# Patient Record
Sex: FEMALE | Race: WHITE | Employment: STUDENT | URBAN - METROPOLITAN AREA
[De-identification: names, ages, dates, MRNs, and addresses within clinical notes are randomized per-mention and may not be internally consistent; named-entity substitution may affect disease eponyms.]

---

## 2017-01-05 ENCOUNTER — HOSPITAL ENCOUNTER (OUTPATIENT)
Dept: DIABETES SERVICES | Age: 22
Discharge: HOME | End: 2017-01-05

## 2017-01-05 DIAGNOSIS — E10.9 TYPE 1 DIABETES MELLITUS WITHOUT COMPLICATION (HCC): ICD-10-CM

## 2017-03-06 ENCOUNTER — OFFICE VISIT (OUTPATIENT)
Dept: OBGYN CLINIC | Age: 22
End: 2017-03-06

## 2017-03-06 VITALS
BODY MASS INDEX: 22.7 KG/M2 | WEIGHT: 144.6 LBS | DIASTOLIC BLOOD PRESSURE: 74 MMHG | HEIGHT: 67 IN | SYSTOLIC BLOOD PRESSURE: 120 MMHG

## 2017-03-06 DIAGNOSIS — N92.1 METRORRHAGIA: Primary | ICD-10-CM

## 2017-03-06 NOTE — PROGRESS NOTES
The patient is a 25 y.o.,  Patient's last menstrual period was 2017 (approximate). .  Started OC for heavy periods - she is type I DM. Has had BTB for long time. She is here for contraceptive counseling/folllow up. Her current method of family planning is pill, Dane Fruits. The patient is sexually active. She states she is not satisfied with her current form of contraception because: Patient stated she started taking pill for menses related issues and she was not sexually active. She is now sexually active and has some breakthrough bleeding. She has been using condoms in addition to taking the pill. She was told when she became sexually active she would need to adjust her birth control. She would like to discuss other options including possible IUD. Contraceptive History: has not used other contraception in the past.      Her current menstrual difficulty history: none     Her previous menses she describes as moderate. Preventive Medicine History: Last Pap smear:patient has never had a pap test due to age/protocol. Patient was advised annual is due 17, appointment made. Past Medical History:   Diagnosis Date    Asthma     Diabetes mellitus ([facility] [address]     type 1    Headache(784.0)         History reviewed. No pertinent surgical history. Social History     Occupational History    Not on file.      Social History Main Topics    Smoking status: Never Smoker    Smokeless tobacco: Not on file    Alcohol use No    Drug use: No    Sexual activity: Yes     Partners: Male     Birth control/ protection: Pill, Condom     Family History   Problem Relation Age of Onset    Diabetes Maternal Grandmother      type2    Heart Disease Maternal Grandmother      pacemaker    Other Maternal Grandfather      non hodgkins lymphoma    No Known Problems Mother     No Known Problems Father     Breast Cancer Other        Allergies   Allergen Reactions    Augmentin [Amoxicillin-Pot Clavulanate] Hives  Augmentin [Amoxicillin-Pot Clavulanate] Hives     Prior to Admission medications    Medication Sig Start Date End Date Taking? Authorizing Provider   Norethindrn A-E Estradiol-Iron (LOMEDIA 24 FE) 1 mg-20 mcg (24)/75 mg (4) tab Take 1 Tab by mouth daily. 5/23/16  Yes Ena Altamirano MD   insulin lispro (HUMALOG) 100 unit/mL injection To use up to 100 units daily via insulin pump. 3/11/16  Yes Catalino Aviles MD   glucose blood VI test strips (FREESTYLE TEST) strip TEST UP TO 10 TIMES A DAY 10/12/15  Yes Catalino Aviles MD   Insulin Needles, Disposable, ([place]STON PEN NEEDLE) 32 x 5/32 \" ndle 50 Each by Does Not Apply route four (4) times daily. Use as direct by physician 2/12/15  Yes Catalino Aviles MD   insulin glargine (LANTUS SOLOSTAR) 100 unit/mL (3 mL) pen The patient is to take up to 50 units in a day 2/12/15  Yes Catalino Aviles MD   glucose blood VI test strips (FREESTYLE TEST) strip To test blood sugars up to 10 times daily 9/4/14  Yes Catalino Aviles MD   Lancets (ONE [facility]) misc To test up to 10 times daily 3/24/14  Yes Arlette Hays MD   GLUCAGON EMERGENCY 1 mg injection INJECT 1ML BY INTRAMUSCULAR ROUTE AS NEEDED FOR HYPOGLYCEMIA 8/28/13  Yes Catalino Aviles MD   LANCING DEVICE/LANCETS (ONE TOUCH [facility] [address]) by Does Not Apply route. Yes Historical Provider   glucose 4 gram chewable tablet Take 4 Tabs by mouth as needed. 8/8/12  Yes Jaz Izquierdo MD   multivitamin (ONE A DAY) tablet Take 1 Tab by mouth daily.      Yes Historical Provider        Review of Systems - History obtained from the patient  Constitutional: negative for weight loss, fever, night sweats  HEENT: negative for hearing loss, earache, congestion, snoring, sorethroat  CV: negative for chest pain, palpitations, edema  Resp: negative for cough, shortness of breath, wheezing  Breast: negative for breast lumps, nipple discharge, galactorrhea  GI: negative for change in bowel habits, abdominal pain, black or bloody stools  : negative for frequency, dysuria, hematuria  MSK: negative for back pain, joint pain, muscle pain  Skin: negative for itching, rash, hives  Neuro: negative for dizziness, headache, confusion, weakness  Psych: negative for anxiety, depression, change in mood  Heme/lymph: negative for bleeding, bruising, pallor      Objective:    Visit Vitals    /74    Ht 5' 6.73\" (1.695 m)    Wt 144 lb 9.6 oz (65.6 kg)    LMP 03/03/2017 (Approximate)    BMI 22.83 kg/m2          PHYSICAL EXAMINATION    Constitutional  · Appearance: well-nourished, well developed, alert, in no acute distress    HENT  · Head and Face: appears normal      Skin  · General Inspection: no rash, no lesions identified    Neurologic/Psychiatric  · Mental Status:  · Orientation: grossly oriented to person, place and time  · Mood and Affect: mood normal, affect appropriate    Assessment:   Type I DM  BTB on OC  Hx of heavy periods  Plan:   Place [facility] this week        Instructions given to pt. Handouts given to pt.

## 2017-03-08 ENCOUNTER — HOSPITAL ENCOUNTER (OUTPATIENT)
Dept: DIABETES SERVICES | Age: 22
Discharge: HOME | End: 2017-03-08

## 2017-03-08 DIAGNOSIS — E10.9 TYPE 1 DIABETES MELLITUS WITHOUT COMPLICATION (HCC): ICD-10-CM

## 2017-03-09 ENCOUNTER — OFFICE VISIT (OUTPATIENT)
Dept: OBGYN CLINIC | Age: 22
End: 2017-03-09

## 2017-03-09 VITALS
HEIGHT: 66 IN | DIASTOLIC BLOOD PRESSURE: 60 MMHG | BODY MASS INDEX: 23.14 KG/M2 | SYSTOLIC BLOOD PRESSURE: 116 MMHG | WEIGHT: 144 LBS

## 2017-03-09 DIAGNOSIS — Z30.430 ENCOUNTER FOR IUD INSERTION: Primary | ICD-10-CM

## 2017-03-09 DIAGNOSIS — Z32.02 NEGATIVE PREGNANCY TEST: ICD-10-CM

## 2017-03-09 LAB
HCG URINE, QL. (POC): NEGATIVE
VALID INTERNAL CONTROL?: YES

## 2017-03-09 NOTE — PROGRESS NOTES
[facility]  OFFICE PROCEDURE PROGRESS NOTE        Chart reviewed for the following:   Archana JANSEN, have reviewed the History, Physical and updated the Allergic reactions for Laurita Gomez performed immediately prior to start of procedure:   Archana JANSEN, have performed the following reviews on Alanna Jaimes prior to the start of the procedure:            * Patient was identified by name and date of birth   * Agreement on procedure being performed was verified  * Risks and Benefits explained to the patient  * Procedure site verified and marked as necessary  * Patient was positioned for comfort  * Consent was signed and verified     Time: 11:13am      Date of procedure: 3/9/2017    Procedure performed by:  Germaine Haji MD    Patient assisted by: self    How tolerated by patient: tolerated the procedure well with no complications    Post Procedural Pain Scale: 2 - Hurts Little Bit    Comments: none      KYLEENA IUD INSERTION  Indications:  Alanna Jaimes is a [de-identified] ,  25 y.o. female [facility] Patient's last menstrual period was 03/03/2017 (approximate). Her LMP was normal in duration and amount of flow. She presents for insertion of an IUD. The risks, benefits and alternatives of IUD insertion were discussed in detail at last visit. She also has reviewed [place] information. She has elected to proceed with the insertion today and she states she has no further questions. A urine pregnancy test was negative   Procedure: The pelvic exam revealed normal external genitalia. On bimanual exam the uterus was anteverted and normal in size with no tenderness present. A speculum was inserted into the vagina and the cervix was visualized. The cervix was prepped with zephiran solution. The anterior lip of the cervix was grasped with a single toothed tenaculum. The uterus was sounded with a Muse sound to 7 centimeters.  Theta Maura IUD was then inserted without difficulty. The string was cut to 3 centimeters. She experienced a mild  amount of cramping. Post Procedure Status:   She tolerated the procedure with mild discomfort. The patient was observed for 10 minutes after the insertion. There were no complications. Patient was discharged in stable condition. The patient received Kyleena lot number [id number].     Disc expulsion, infection, bleeding  FU in 4 weeks with ultrasound

## 2017-03-09 NOTE — PATIENT INSTRUCTIONS
Intrauterine Device (IUD) Insertion: Care Instructions  Your Care Instructions    The intrauterine device (IUD) is a very effective method of birth control. It is a small, plastic, T-shaped device that contains copper or hormones. The doctor inserts the IUD into your uterus. A plastic string tied to the end of the IUD hangs down through the cervix into the vagina. There are two types of IUDs. The copper IUD is effective for up to 10 years. The hormonal IUD is effective for either 3 years or 5 years, depending on which IUD is used. The hormonal IUD also reduces menstrual bleeding and cramping. Both types of IUD damage or kill the man's sperm. This means that the woman's egg does not join with the sperm. IUDs also change the lining of the uterus so that the egg does not lodge there. The IUD is most likely to work well for women who have been pregnant before. Some women who have never been pregnant have more trouble keeping the IUD in the uterus. They also may have more pain and cramping after insertion. Follow-up care is a key part of your treatment and safety. Be sure to make and go to all appointments, and call your doctor if you are having problems. It's also a good idea to know your test results and keep a list of the medicines you take. How can you care for yourself at home? · You may experience some mild cramping and light bleeding (spotting) for 1 or 2 days. Use a hot water bottle or a heating pad set on low on your belly for pain. · Take an over-the-counter pain medicine, such as acetaminophen (Tylenol), ibuprofen (Advil, Motrin), and naproxen (Aleve) if needed. Read and follow all instructions on the label. · Do not take two or more pain medicines at the same time unless the doctor told you to. Many pain medicines have acetaminophen, which is Tylenol. Too much acetaminophen (Tylenol) can be harmful. · Check the string of your IUD after every period.  To do this, insert a finger into your vagina and feel for the cervix, which is at the top of the vagina and feels harder than the rest of your vagina. You should be able to feel the thin, plastic string coming out of the opening of your cervix. If you cannot feel the string, use another form of birth control and make an appointment with your doctor to have the string checked. · If the IUD comes out, save it and call your doctor. Be sure to use another form of birth control while the IUD is out. · Use latex condoms to protect against sexually transmitted infections (STIs), such as gonorrhea and chlamydia. An IUD does not protect you from STIs. Having one sex partner (who does not have STIs and does not have sex with anyone else) is a good way to avoid STIs. When should you call for help? Call 911 anytime you think you may need emergency care. For example, call if:  · You passed out (lost consciousness). · You have sudden, severe pain in your belly or pelvis. Call your doctor now or seek immediate medical care if:  · You have new belly or pelvic pain. · You have severe vaginal bleeding. This means that you are soaking through your usual pads or tampons each hour for 2 or more hours. · You are dizzy or lightheaded, or you feel like you may faint. · You have a fever and pelvic pain or vaginal discharge. · You have pelvic pain that is getting worse. Watch closely for changes in your health, and be sure to contact your doctor if:  · You cannot feel the string, or the IUD comes out. · You feel sick to your stomach, or you vomit. · You think you may be pregnant. Where can you learn more? Go to http://zee-bindu.info/. Enter B987 in the search box to learn more about \"Intrauterine Device (IUD) Insertion: Care Instructions. \"  Current as of: May 30, 2016  Content Version: 11.1  © 6690-1195 [employer].  Care instructions adapted under license by [employer] (which disclaims liability or warranty for this information). If you have questions about a medical condition or this instruction, always ask your healthcare professional. [place] [address] any warranty or liability for your use of this information.

## 2017-03-11 ENCOUNTER — TELEPHONE (OUTPATIENT)
Dept: OBGYN CLINIC | Age: 22
End: 2017-03-11

## 2017-03-13 ENCOUNTER — PATIENT MESSAGE (OUTPATIENT)
Dept: OBGYN CLINIC | Age: 22
End: 2017-03-13

## 2017-03-15 NOTE — TELEPHONE ENCOUNTER
Patient called on-call doc saying she thinks IUD is coming out 2 days after insertion. I have called patient twice and 2nd message left. I have scheduled patient for an ultrasound in our office for tomorrow and she needs to call us back to confirm. I also tavon messaged patient, but she has not read it yet.

## 2017-03-16 ENCOUNTER — TELEPHONE (OUTPATIENT)
Dept: OBGYN CLINIC | Age: 22
End: 2017-03-16

## 2017-05-08 ENCOUNTER — OFFICE VISIT (OUTPATIENT)
Dept: OBGYN CLINIC | Age: 22
End: 2017-05-08

## 2017-05-08 VITALS
SYSTOLIC BLOOD PRESSURE: 100 MMHG | BODY MASS INDEX: 23.14 KG/M2 | HEIGHT: 66 IN | WEIGHT: 144 LBS | DIASTOLIC BLOOD PRESSURE: 58 MMHG

## 2017-05-08 DIAGNOSIS — N92.6 IRREGULAR BLEEDING: Primary | ICD-10-CM

## 2017-05-08 RX ORDER — MEDROXYPROGESTERONE ACETATE 10 MG/1
10 TABLET ORAL DAILY
Qty: 10 TAB | Refills: 0 | Status: SHIPPED | OUTPATIENT
Start: 2017-05-08 | End: 2017-05-31

## 2017-05-08 NOTE — PATIENT INSTRUCTIONS
Pelvic Exam: Care Instructions  Your Care Instructions    When your doctor examines all of your pelvic organs, it's called a pelvic exam. Two good reasons to have this kind of exam are to check for sexually transmitted infections (STIs) and to get a Pap test. A Pap test is also called a Pap smear. It checks for early changes that can lead to cancer of the cervix. Sometimes a pelvic exam is part of a regular checkup. In this case, you can do some things to make your test results as accurate as possible. · Try to schedule the exam when you don't have your period. · Don't use douches, tampons, or vaginal medicines, sprays, or powders for 24 hours before your exam.  · Don't have sex for 24 hours before your exam.  Other times, women have this kind of exam at any time of the month. This is because they have pelvic pain, bleeding, or discharge. Or they may have another pelvic problem. Before your exam, it's important to share some information with your doctor. For example, if you are a survivor of rape or sexual abuse, you can talk about any concerns you may have. Your doctor will also want to know if you are pregnant or use birth control. And he or she will want to hear about any problems, surgeries, or procedures you have had in your pelvic area. You will also need to tell your doctor when your last period was. Follow-up care is a key part of your treatment and safety. Be sure to make and go to all appointments, and call your doctor if you are having problems. It's also a good idea to know your test results and keep a list of the medicines you take. How is a pelvic exam done? · During a pelvic exam, you will:  ¨ Take off your clothes below the waist. You will get a paper or cloth cover to put over the lower half of your body. Bobby Bah on your back on an exam table. Your feet will be raised above you. Stirrups will support your feet. · The doctor will:  Cherise Daunt you to relax your knees.  Your knees need to lean out, toward the walls. ¨ Check the opening of your vagina for sores or swelling. ¨ Gently put a tool called a speculum into your vagina. It opens the vagina a little bit. You will feel some pressure. But if you are relaxed, it will not hurt. It lets your doctor see inside the vagina. ¨ Use a small brush, spatula, or swab to get a sample of cells, if you are having a Pap test or culture. The doctor then removes the speculum. ¨ Put on gloves and put one or two fingers of one hand into your vagina. The other hand goes on your lower belly. This lets your doctor feel your pelvic organs. You will probably feel some pressure. Try to stay relaxed. ¨ Put one gloved finger into your rectum and one into your vagina, if needed. This can also help check your pelvic organs. This exam takes about 10 minutes. At the end, you will get a washcloth or tissue to clean your vaginal area. It's normal to have some discharge after this exam. You can then get dressed. Some test results may be ready right away. But results from a culture or a Pap test may take several days or a few weeks. Why should you have a pelvic exam?  · You want to have recommended screening tests. This includes a Pap test.  · You think you have a vaginal infection. Signs include itching, burning, or unusual discharge. · You might have been exposed to a sexually transmitted infection (STI), such as chlamydia or herpes. · You have vaginal bleeding that is not part of your normal menstrual period. · You have pain in your belly or pelvis. · You have been sexually assaulted. A pelvic exam lets your doctor collect evidence and check for STIs. · You are pregnant. · You are having trouble getting pregnant. What are the risks of a pelvic exam?  There are no risks from a pelvic exam.  When should you call for help?   Watch closely for changes in your health, and be sure to contact your doctor if:  · You have heavy bleeding or discharge from your vagina after the exam.  Where can you learn more? Go to http://zee-bindu.info/. Enter X575 in the search box to learn more about \"Pelvic Exam: Care Instructions. \"  Current as of: October 13, 2016  Content Version: 11.2  © 1126-2987 [employer]. Care instructions adapted under license by [employer] (which disclaims liability or warranty for this information). If you have questions about a medical condition or this instruction, always ask your healthcare professional. [address] any warranty or liability for your use of this information.

## 2017-05-08 NOTE — PROGRESS NOTES
This is a follow-up visit for Anibal Jose is a [de-identified] ,  23 y.o. female [facility] whose No LMP recorded. Patient is not currently having periods (Reason: IUD). She had a kyleena IUD placed 3/2017. Since the IUD placement, the patient has not had any unusual complaints. She has had some mild non-menstrual bleeding. She describes having a spotting amount of blood-tinged discharge which has occurred off and on since insertion of the [place]. She has not had significant pain. She has had no fever. Associated signs and symptoms: she denies dyspareunia, expulsion, heavy bleeding, increased pain, fever, and pelvic pain. Ultrasound was done today and revealed appropriate placement of the IUD in the endometrial cavity. UTERUS IS ANTEVERTED, NORMAL IN SIZE AND ECHOGENICITY. ENDOMETRIUM MEASURES 5-6MM IN THICKNESS. NO EVIDENCE OF MASS OR ABNORMALITY SEEN  WITHIN THE ENDOMETRIAL CAVITY. IUD IS SEEN IN THE PROPER POSITION WITHIN THE ENDOMETRIAL CAVITY IN THE UTERINE FUNDUS. RIGHT OVARY APPEARS WITHIN NORMAL LIMITS. A FOLLICULAR CYST IS SEEN. LEFT OVARY APPEARS WITHIN NORMAL LIMITS. NO FREE FLUID SEEN IN THE CDS. Past Medical History:   Diagnosis Date    Asthma     Diabetes mellitus ([address]     type 1    Encounter for IUD insertion 03/09/2017    [facility] placed    Headache      History reviewed. No pertinent surgical history. Social History     Occupational History    Not on file.      Social History Main Topics    Smoking status: Never Smoker    Smokeless tobacco: Not on file    Alcohol use No    Drug use: No    Sexual activity: Yes     Partners: Male     Birth control/ protection: IUD     Family History   Problem Relation Age of Onset    Diabetes Maternal Grandmother      type2    Heart Disease Maternal Grandmother      pacemaker    Other Maternal Grandfather      non hodgkins lymphoma    No Known Problems Mother     No Known Problems Father     Breast Cancer Other Allergies   Allergen Reactions    Augmentin [Amoxicillin-Pot Clavulanate] Hives    Augmentin [Amoxicillin-Pot Clavulanate] Hives     Prior to Admission medications    Medication Sig Start Date End Date Taking? Authorizing Provider   insulin lispro (HUMALOG) 100 unit/mL injection To use up to 100 units daily via insulin pump. 3/11/16  Yes Genny Schneider MD   glucose blood VI test strips (FREESTYLE TEST) strip TEST UP TO 10 TIMES A DAY 10/12/15  Yes Genny Schneider MD   Insulin Needles, Disposable, ([place]STON PEN NEEDLE) 32 x 5/32 \" ndle 50 Each by Does Not Apply route four (4) times daily. Use as direct by physician 2/12/15  Yes Genny Schneider MD   insulin glargine (LANTUS SOLOSTAR) 100 unit/mL (3 mL) pen The patient is to take up to 50 units in a day 2/12/15  Yes Genny Schneider MD   glucose blood VI test strips (FREESTYLE TEST) strip To test blood sugars up to 10 times daily 9/4/14  Yes Genny Schneider MD   Lancets ([facility]) misc To test up to 10 times daily 3/24/14  Yes Vanessa Lemon MD   GLUCAGON EMERGENCY 1 mg injection INJECT 1ML BY INTRAMUSCULAR ROUTE AS NEEDED FOR HYPOGLYCEMIA 8/28/13  Yes Genny Schneider MD   LANCING DEVICE/LANCETS (ONE TOUCH [facility] [address]) by Does Not Apply route. Yes Historical Provider   glucose 4 gram chewable tablet Take 4 Tabs by mouth as needed. 8/8/12  Yes Kevin Benton MD   multivitamin (ONE A DAY) tablet Take 1 Tab by mouth daily. Yes Historical Provider   Norethindrn A-E Estradiol-Iron (LOMEDIA 24 FE) 1 mg-20 mcg (24)/75 mg (4) tab Take 1 Tab by mouth daily.  5/23/16   Liss Saunders MD        Review of Systems: History obtained from the patient  Constitutional: negative for weight loss, fever, night sweats  Breast: negative for breast lumps, nipple discharge, galactorrhea  GI: negative for change in bowel habits, abdominal pain, black or bloody stools  : negative for frequency, dysuria, hematuria, vaginal discharge  MSK: negative for back pain, joint pain, muscle pain  Skin: negative for itching, rash, hives  Psych: negative for anxiety, depression, change in mood      Objective:  Visit Vitals    /58    Ht 5' 6\" (1.676 m)    Wt 144 lb (65.3 kg)    BMI 23.24 kg/m2       Physical Exam:   PHYSICAL EXAMINATION    Constitutional  · Appearance: well-nourished, well developed, alert, in no acute distress    Gastrointestinal  · Abdominal Examination: abdomen non-tender to palpation, normal bowel sounds, no masses present  · Liver and spleen: no hepatomegaly present, spleen not palpable  · Hernias: no hernias identified    Genitourinary  · External Genitalia: normal appearance for age, no discharge present, no tenderness present, no inflammatory lesions present, no masses present, no atrophy present  · Vagina: normal vaginal vault without central or paravaginal defects, no discharge present, no inflammatory lesions present, no masses present  · Bladder: non-tender to palpation  · Urethra: appears normal  · Cervix: normal with IUD string visible and appropriate length   · Uterus: normal size, shape and consistency  · Adnexa: no adnexal tenderness present, no adnexal masses present  · Perineum: perineum within normal limits, no evidence of trauma, no rashes or skin lesions present    Skin  · General Inspection: no rash, no lesions identified    Neurologic/Psychiatric  · Mental Status:  · Orientation: grossly oriented to person, place and time  · Mood and Affect: mood normal, affect appropriate    Assessment:   Irregular bleeding with Kyleena - hx of irregular cycles    Plan:   Try Provera withdrawal to enmanuel the bleeding

## 2017-05-31 ENCOUNTER — OFFICE VISIT (OUTPATIENT)
Dept: OBGYN CLINIC | Age: 22
End: 2017-05-31

## 2017-05-31 VITALS
WEIGHT: 149.8 LBS | HEIGHT: 66 IN | BODY MASS INDEX: 24.08 KG/M2 | DIASTOLIC BLOOD PRESSURE: 70 MMHG | SYSTOLIC BLOOD PRESSURE: 118 MMHG

## 2017-05-31 DIAGNOSIS — Z11.3 SCREENING EXAMINATION FOR VENEREAL DISEASE: ICD-10-CM

## 2017-05-31 DIAGNOSIS — Z01.419 ENCOUNTER FOR GYNECOLOGICAL EXAMINATION WITHOUT ABNORMAL FINDING: Primary | ICD-10-CM

## 2017-05-31 NOTE — PROGRESS NOTES
Jasmine Alvarado is a [de-identified] ,  23 y.o. female [facility] whose No LMP recorded. Patient is not currently having periods (Reason: IUD). who presents for her annual checkup. She is having no significant problems. With regard to the Gardisil vaccine, she declines to receive it. Menstrual status:    Her periods are minimal to nonexistent in flow. She is using essentially none pads or tampons per day, minimal to none using Kyleena. She denies dysmenorrhea. She reports no premenstrual symptoms. Contraception:    The current method of family planning is IUD. Sexual history:    She  reports that she currently engages in sexual activity and has had male partners. She reports using the following method of birth control/protection: IUD. Medical conditions:    Since her last annual GYN exam about one year ago, she has not the following changes in her health history: none. Pap and Mammogram History:    She has never had a pap smear due to age/protocol. The patient has never had a mammogram.    The patient does have a family history of breast cancer. Past Medical History:   Diagnosis Date    Asthma     Diabetes mellitus ([facility] [address]     type 1    Encounter for IUD insertion 03/09/2017    Pinkkarl Rashid placed    Headache      History reviewed. No pertinent surgical history. Current Outpatient Prescriptions   Medication Sig Dispense Refill    insulin lispro (HUMALOG) 100 unit/mL injection To use up to 100 units daily via insulin pump. 3 Vial 1    glucose blood VI test strips (FREESTYLE TEST) strip TEST UP TO 10 TIMES A  Strip 4    Insulin Needles, Disposable, ([place] PEN NEEDLE) 32 x 5/32 \" ndle 50 Each by Does Not Apply route four (4) times daily.  Use as direct by physician 50 Each 1    insulin glargine (LANTUS SOLOSTAR) 100 unit/mL (3 mL) pen The patient is to take up to 50 units in a day 1 Package 1    glucose blood VI test strips (FREESTYLE TEST) strip To test blood sugars up to 10 times daily 3 Package 6    Lancets (ONE TOUCH DELICA) misc To test up to 10 times daily 3 Package 6    GLUCAGON EMERGENCY 1 mg injection INJECT 1ML BY INTRAMUSCULAR ROUTE AS NEEDED FOR HYPOGLYCEMIA 1 Kit 1    LANCING DEVICE/LANCETS ([facility]CH [facility]) by Does Not Apply route.  glucose 4 gram chewable tablet Take 4 Tabs by mouth as needed. 30 Tab 1 year    multivitamin (ONE A DAY) tablet Take 1 Tab by mouth daily.  medroxyPROGESTERone (PROVERA) 10 mg tablet Take 1 Tab by mouth daily. 10 Tab 0    Norethindrn A-E Estradiol-Iron (LOMEDIA 24 FE) 1 mg-20 mcg (24)/75 mg (4) tab Take 1 Tab by mouth daily. 90 Tab 4     Allergies: Augmentin [amoxicillin-pot clavulanate] and Augmentin [amoxicillin-pot clavulanate]   Social History     Social History    Marital status: SINGLE     Spouse name: N/A    Number of children: N/A    Years of education: N/A     Occupational History    Not on file. Social History Main Topics    Smoking status: Never Smoker    Smokeless tobacco: Not on file    Alcohol use No    Drug use: No    Sexual activity: Yes     Partners: Male     Birth control/ protection: IUD     Other Topics Concern    Not on file     Social History Narrative    ** Merged History Encounter **          Tobacco History:  reports that she has never smoked. She does not have any smokeless tobacco history on file. Alcohol Abuse:  reports that she does not drink alcohol. Drug Abuse:  reports that she does not use illicit drugs. Patient Active Problem List   Diagnosis Code    DKA (diabetic ketoacidoses) ([facility] [address] E13.10    DM type 1 (diabetes mellitus, type 1) ([facility] [address] E10.9    Migraine with aura G43. 109       Review of Systems - History obtained from the patient  Constitutional: negative for weight loss, fever, night sweats  HEENT: negative for hearing loss, earache, congestion, snoring, sorethroat  CV: negative for chest pain, palpitations, edema  Resp: negative for cough, shortness of breath, wheezing  GI: negative for change in bowel habits, abdominal pain, black or bloody stools  : negative for frequency, dysuria, hematuria, vaginal discharge  MSK: negative for back pain, joint pain, muscle pain  Breast: negative for breast lumps, nipple discharge, galactorrhea  Skin :negative for itching, rash, hives  Neuro: negative for dizziness, headache, confusion, weakness  Psych: negative for anxiety, depression, change in mood  Heme/lymph: negative for bleeding, bruising, pallor    Physical Exam    Visit Vitals    /70    Ht 5' 6\" (1.676 m)    Wt 149 lb 12.8 oz (67.9 kg)    BMI 24.18 kg/m2       Constitutional  · Appearance: well-nourished, well developed, alert, in no acute distress    HENT  · Head and Face: appears normal    Neck  · Inspection/Palpation: normal appearance, no masses or tenderness  · Lymph Nodes: no lymphadenopathy present  · Thyroid: gland size normal, nontender, no nodules or masses present on palpation    Chest  · Respiratory Effort: breathing normal  · Auscultation: normal breath sounds    Cardiovascular  · Heart:  · Auscultation: regular rate and rhythm without murmur    Breasts  · Inspection of Breasts: breasts symmetrical, no skin changes, no discharge present, nipple appearance normal, no skin retraction present  · Palpation of Breasts and Axillae: no masses present on palpation, no breast tenderness  · Axillary Lymph Nodes: no lymphadenopathy present    Gastrointestinal  · Abdominal Examination: abdomen non-tender to palpation, normal bowel sounds, no masses present  · Liver and spleen: no hepatomegaly present, spleen not palpable  · Hernias: no hernias identified    Genitourinary  · External Genitalia: normal appearance for age, no discharge present, no tenderness present, no inflammatory lesions present, no masses present, no atrophy present  · Vagina: normal vaginal vault without central or paravaginal defects, no discharge present, no inflammatory lesions present, no masses present  · Bladder: non-tender to palpation  · Urethra: appears normal  · Cervix: normal, string seen   · Uterus: normal size, shape and consistency  · Adnexa: no adnexal tenderness present, no adnexal masses present  · Perineum: perineum within normal limits, no evidence of trauma, no rashes or skin lesions present  · Anus: anus within normal limits, no hemorrhoids present  · Inguinal Lymph Nodes: no lymphadenopathy present    Skin  · General Inspection: no rash, no lesions identified    Neurologic/Psychiatric  · Mental Status:  · Orientation: grossly oriented to person, place and time  · Mood and Affect: mood normal, affect appropriate    . Assessment:  Routine gynecologic examination  Her current medical status is satisfactory with no evidence of significant gynecologic issues. Dyspareunia    Plan:  Counseled re: diet, exercise, healthy lifestyle  Return for yearly wellness visits  Gardisil counseling provided - pt declines  Offered PT - pt declines.  Use lots of lubrication

## 2017-05-31 NOTE — PATIENT INSTRUCTIONS
Breast Self-Exam: Care Instructions  Your Care Instructions  A breast self-exam is when you check your breasts for lumps or changes. This regular exam helps you learn how your breasts normally look and feel. Most breast problems or changes are not because of cancer. Breast self-exam is not a substitute for a mammogram. Having regular breast exams by your doctor and regular mammograms improve your chances of finding any problems with your breasts. Some women set a time each month to do a step-by-step breast self-exam. Other women like a less formal system. They might look at their breasts as they brush their teeth, or feel their breasts once in a while in the shower. If you notice a change in your breast, tell your doctor. Follow-up care is a key part of your treatment and safety. Be sure to make and go to all appointments, and call your doctor if you are having problems. Its also a good idea to know your test results and keep a list of the medicines you take. How do you do a breast self-exam?  · The best time to examine your breasts is usually one week after your menstrual period begins. Your breasts should not be tender then. If you do not have periods, you might do your exam on a day of the month that is easy to remember. · To examine your breasts:  ¨ Remove all your clothes above the waist and lie down. When you are lying down, your breast tissue spreads evenly over your chest wall, which makes it easier to feel all your breast tissue. ¨ Use the pads--not the fingertips--of the 3 middle fingers of your left hand to check your right breast. Move your fingers slowly in small coin-sized circles that overlap. ¨ Use three levels of pressure to feel of all your breast tissue. Use light pressure to feel the tissue close to the skin surface. Use medium pressure to feel a little deeper. Use firm pressure to feel your tissue close to your breastbone and ribs.  Use each pressure level to feel your breast tissue before moving on to the next spot. ¨ Check your entire breast, moving up and down as if following a strip from the collarbone to the bra line, and from the armpit to the ribs. Repeat until you have covered the entire breast.  ¨ Repeat this procedure for your left breast, using the pads of the 3 middle fingers of your right hand. · To examine your breasts while in the shower:  ¨ Place one arm over your head and lightly soap your breast on that side. ¨ Using the pads of your fingers, gently move your hand over your breast (in the strip pattern described above), feeling carefully for any lumps or changes. ¨ Repeat for the other breast.  · Have your doctor inspect anything you notice to see if you need further testing. Where can you learn more? Go to http://zee-bindu.info/. Enter P148 in the search box to learn more about \"Breast Self-Exam: Care Instructions. \"  Current as of: July 26, 2016  Content Version: 11.2  © 2591-3805 [employer]. Care instructions adapted under license by [employer] (which disclaims liability or warranty for this information). If you have questions about a medical condition or this instruction, always ask your healthcare professional. [place] [address] any warranty or liability for your use of this information.

## 2017-06-02 LAB
C TRACH RRNA SPEC QL NAA+PROBE: NEGATIVE
N GONORRHOEA RRNA SPEC QL NAA+PROBE: NEGATIVE
T VAGINALIS RRNA SPEC QL NAA+PROBE: NEGATIVE